# Patient Record
Sex: FEMALE | URBAN - METROPOLITAN AREA
[De-identification: names, ages, dates, MRNs, and addresses within clinical notes are randomized per-mention and may not be internally consistent; named-entity substitution may affect disease eponyms.]

---

## 2024-09-23 ENCOUNTER — OFFICE VISIT (OUTPATIENT)
Dept: URGENT CARE | Age: 34
End: 2024-09-23
Payer: COMMERCIAL

## 2024-09-23 VITALS
OXYGEN SATURATION: 98 % | SYSTOLIC BLOOD PRESSURE: 94 MMHG | TEMPERATURE: 98.5 F | HEART RATE: 61 BPM | RESPIRATION RATE: 16 BRPM | DIASTOLIC BLOOD PRESSURE: 73 MMHG

## 2024-09-23 DIAGNOSIS — S05.02XA ABRASION OF LEFT CORNEA, INITIAL ENCOUNTER: Primary | ICD-10-CM

## 2024-09-23 RX ORDER — OFLOXACIN 3 MG/ML
2 SOLUTION/ DROPS OPHTHALMIC 4 TIMES DAILY
Qty: 10 ML | Refills: 0 | Status: SHIPPED | OUTPATIENT
Start: 2024-09-23 | End: 2024-09-28

## 2024-09-23 ASSESSMENT — ENCOUNTER SYMPTOMS
EYE REDNESS: 1
EYE PAIN: 1
PHOTOPHOBIA: 1
EYE DISCHARGE: 0
EYE ITCHING: 0

## 2024-09-23 ASSESSMENT — VISUAL ACUITY: OU: 1

## 2024-09-23 NOTE — PROGRESS NOTES
Subjective   Patient ID: Venus Lester is a 34 y.o. female. They present today with a chief complaint of left eye pain (Left eye pain with redness x 2 days.).    HISTORY OF PRESENT ILLNESS:    Patient presents to the clinic for left-sided eye redness, irritation, gritty sensation, and light sensitivity x 2 days. States symptoms began after removing a contact lens. Denies known trauma or injury to eye. States she removed all contact lenses. Does not believe there is a foreign body in the eye. Denies visual change. Feeling well otherwise. Last tetanus booster within 10 years.    Past Medical History  Allergies as of 09/23/2024 - Reviewed 09/23/2024   Allergen Reaction Noted    Macrobid [nitrofurantoin monohyd/m-cryst] Hives 09/23/2024       Current Outpatient Medications   Medication Instructions    ofloxacin (Ocuflox) 0.3 % ophthalmic solution 2 drops, Left Eye, 4 times daily         No past medical history on file.    No past surgical history on file.     reports that she has never smoked. She has never used smokeless tobacco.    Review of Systems    Review of Systems   Eyes:  Positive for photophobia, pain and redness. Negative for discharge, itching and visual disturbance.        Left eye   All other systems reviewed and are negative.                              Objective    Vitals:    09/23/24 1244   BP: 94/73   Pulse: 61   Resp: 16   Temp: 36.9 °C (98.5 °F)   SpO2: 98%     No LMP recorded.  PHYSICAL EXAMINATION:    Physical Exam  Vitals and nursing note reviewed.   Constitutional:       General: She is not in acute distress.     Appearance: Normal appearance. She is not ill-appearing.   HENT:      Head: Normocephalic and atraumatic.      Nose: Nose normal.   Eyes:      General: Lids are normal. Vision grossly intact.         Right eye: No discharge or hordeolum.         Left eye: No discharge or hordeolum.      Extraocular Movements: Extraocular movements intact.      Right eye: Normal extraocular motion and no  nystagmus.      Left eye: Normal extraocular motion and no nystagmus.      Conjunctiva/sclera:      Right eye: Right conjunctiva is not injected.      Left eye: Left conjunctiva is injected (concentrated at medial canthus).      Pupils: Pupils are equal, round, and reactive to light. Pupils are equal.      Right eye: Pupil is reactive.      Left eye: Pupil is reactive. Fluorescein uptake present.        Comments: (+) pinpoint area of fluorescein dye uptake indicating possible corneal abrasion at 7:00 position as depicted in image above.    Pt declines eversion of left upper eyelid.   Cardiovascular:      Rate and Rhythm: Normal rate.   Pulmonary:      Effort: Pulmonary effort is normal. No respiratory distress.   Musculoskeletal:      Cervical back: Normal range of motion and neck supple.   Skin:     General: Skin is warm and dry.   Neurological:      General: No focal deficit present.      Mental Status: She is alert and oriented to person, place, and time.   Psychiatric:         Mood and Affect: Mood normal.         Behavior: Behavior normal.          Procedures    No results found for this visit on 09/23/24.    Diagnostic study results (if any) were reviewed by Giselle Mclaughlin PA-C.    Assessment/Plan   Allergies, medications, history, and pertinent labs/EKGs/Imaging reviewed by Giselle Mclaughlin PA-C.     Orders and Diagnoses  Diagnoses and all orders for this visit:  Abrasion of left cornea, initial encounter  -     ofloxacin (Ocuflox) 0.3 % ophthalmic solution; Administer 2 drops into the left eye 4 times a day for 5 days.      Medical Admin Record    Given overall well appearance, vital signs, history, and exam as above, there is no indication for further emergent testing/intervention at this time.      I discussed with the patient my clinical thoughts at this time given the above and we had a shared decision-making conversation in a patient-centered decision-making model on how to proceed forward. Pt was  instructed on the importance of close follow-up. They were told that an urgent care diagnosis is often a preliminary impression and that definitive care is often not able to be given in the urgent care setting. Pt was educated that close follow-up is essential for good health and good outcomes. Patient was provided with the following instructions:         Use antibiotic eyedrops as directed.       Ibuprofen for pain as needed.      Plenty of fluids and rest in dimly lit rooms.      No contact lens use for 1 week.     Follow up with eye doctor or RTC in the next 5-7 days if sx fail to show adequate improvement.         Clinical impression as well as limitations of available testing/examination, all discussed with patient. Pt is well at this time in the urgent care. They are comfortable with the present assessment and plan to be discharged home. Discussed with them close outpatient follow up, reassessment, and possible further testing/treatment via their PCP/specialist if symptoms fail to improve; they agree, understand, and are comfortable with this plan. Pt given the opportunity to ask questions prior to discharge and all questions were answered at this time. Via our discussion, the patient was advised of warning signs and instructions were reviewed. Strict ED precautions were given, acknowledged, and understood. Discussed with the patient/family that it is okay to return to the urgent care at any time for anything. Advised to present to the ED if present condition changes/worsens or if they develop new symptoms at any time after discharge. Also, advised to go to the ED if they cannot establish outpatient follow-up in time frame specified above. Pt verbalized understanding and agreement with plan and instructions. Discussed the need for close follow up with their primary care provider and/or specialist for further testing/treatment/care/consultation in the short time frame as noted above, if needed - they understand  these instructions and agree to close follow up for these reasons. Patient discharged home in stable condition.    Follow Up Instructions  No follow-ups on file.    Patient disposition: Home    Electronically signed by Giselle Mclaughlin PA-C  1:16 PM